# Patient Record
Sex: MALE | Race: WHITE | URBAN - METROPOLITAN AREA
[De-identification: names, ages, dates, MRNs, and addresses within clinical notes are randomized per-mention and may not be internally consistent; named-entity substitution may affect disease eponyms.]

---

## 2017-02-27 ENCOUNTER — OFFICE VISIT (OUTPATIENT)
Dept: PAIN MANAGEMENT | Age: 67
End: 2017-02-27

## 2017-02-27 VITALS — RESPIRATION RATE: 17 BRPM | SYSTOLIC BLOOD PRESSURE: 122 MMHG | DIASTOLIC BLOOD PRESSURE: 61 MMHG | HEART RATE: 88 BPM

## 2017-02-27 DIAGNOSIS — M54.59 MECHANICAL LOW BACK PAIN: ICD-10-CM

## 2017-02-27 DIAGNOSIS — I89.0 LYMPHEDEMA OF BOTH LOWER EXTREMITIES: ICD-10-CM

## 2017-02-27 DIAGNOSIS — M17.0 PRIMARY OSTEOARTHRITIS OF BOTH KNEES: ICD-10-CM

## 2017-02-27 DIAGNOSIS — M54.42 CHRONIC BILATERAL LOW BACK PAIN WITH LEFT-SIDED SCIATICA: ICD-10-CM

## 2017-02-27 DIAGNOSIS — M25.561 CHRONIC PAIN OF BOTH KNEES: ICD-10-CM

## 2017-02-27 DIAGNOSIS — M25.50 CHRONIC PAIN OF MULTIPLE JOINTS: Primary | ICD-10-CM

## 2017-02-27 DIAGNOSIS — M25.562 CHRONIC PAIN OF BOTH KNEES: ICD-10-CM

## 2017-02-27 DIAGNOSIS — G89.29 CHRONIC PAIN OF BOTH KNEES: ICD-10-CM

## 2017-02-27 DIAGNOSIS — G89.29 CHRONIC BILATERAL LOW BACK PAIN WITH LEFT-SIDED SCIATICA: ICD-10-CM

## 2017-02-27 DIAGNOSIS — G89.29 CHRONIC PAIN OF MULTIPLE JOINTS: Primary | ICD-10-CM

## 2017-02-27 DIAGNOSIS — G62.9 SENSORY MOTOR NEUROPATHY: ICD-10-CM

## 2017-02-27 RX ORDER — OXYCODONE HYDROCHLORIDE 5 MG/1
5 TABLET ORAL
Qty: 90 TAB | Refills: 0 | Status: SHIPPED | OUTPATIENT
Start: 2017-04-26 | End: 2017-05-18 | Stop reason: SDUPTHER

## 2017-02-27 RX ORDER — OXYCODONE HCL 40 MG/1
40 TABLET, FILM COATED, EXTENDED RELEASE ORAL EVERY 8 HOURS
Qty: 90 TAB | Refills: 0 | Status: SHIPPED | OUTPATIENT
Start: 2017-04-26 | End: 2017-05-18 | Stop reason: SDUPTHER

## 2017-02-27 RX ORDER — OXYCODONE HCL 40 MG/1
40 TABLET, FILM COATED, EXTENDED RELEASE ORAL EVERY 8 HOURS
Qty: 90 TAB | Refills: 0 | Status: SHIPPED | OUTPATIENT
Start: 2017-02-27 | End: 2017-05-18 | Stop reason: SDUPTHER

## 2017-02-27 RX ORDER — OXYCODONE HYDROCHLORIDE 5 MG/1
5 TABLET ORAL
Qty: 90 TAB | Refills: 0 | Status: SHIPPED | OUTPATIENT
Start: 2017-02-27 | End: 2017-05-18 | Stop reason: SDUPTHER

## 2017-02-27 RX ORDER — OXYCODONE HCL 40 MG/1
40 TABLET, FILM COATED, EXTENDED RELEASE ORAL EVERY 8 HOURS
Qty: 90 TAB | Refills: 0 | Status: SHIPPED | OUTPATIENT
Start: 2017-03-26 | End: 2017-05-18 | Stop reason: SDUPTHER

## 2017-02-27 RX ORDER — OXYCODONE HYDROCHLORIDE 5 MG/1
5 TABLET ORAL
Qty: 90 TAB | Refills: 0 | Status: SHIPPED | OUTPATIENT
Start: 2017-03-26 | End: 2017-05-18 | Stop reason: SDUPTHER

## 2017-02-27 NOTE — MR AVS SNAPSHOT
Visit Information Date & Time Provider Department Dept. Phone Encounter #  
 2/27/2017  8:30 AM Ruddy Renee MD [facility] [phone] [id number] Follow-up Instructions Return in about 3 months (around 5/27/2017). Follow-up and Disposition History Upcoming Health Maintenance Date Due Hepatitis C Screening 1950 DTaP/Tdap/Td series (1 - Tdap) 1/21/1973 FOBT Q 1 YEAR AGE 50-75 1/21/2002 ZOSTER VACCINE AGE 60> 1/21/2012 INFLUENZA AGE 9 TO ADULT 8/1/2016 GLAUCOMA SCREENING Q2Y 1/21/2017 Pneumococcal 65+ Low/Medium Risk (1 of 2 - PCV13) 1/21/2017 MEDICARE YEARLY EXAM 1/21/2017 Allergies as of 2/27/2017  Review Complete On: 2/27/2017 By: Ruddy Renee MD  
  
 Severity Noted Reaction Type Reactions Biaxin [Clarithromycin]    Not Reported This Time Current Immunizations  Never Reviewed No immunizations on file. Not reviewed this visit You Were Diagnosed With   
  
 Codes Comments Chronic pain of multiple joints    -  Primary ICD-10-CM: M25.50, G89.29 ICD-9-CM: 719.49, 338.29 Primary osteoarthritis of both knees     ICD-10-CM: M17.0 ICD-9-CM: 715.16 Sensory motor neuropathy     ICD-10-CM: G62.9 ICD-9-CM: 356.9 Chronic bilateral low back pain with left-sided sciatica     ICD-10-CM: M54.42, G89.29 ICD-9-CM: 724.2, 724.3, 338.29 Mechanical low back pain     ICD-10-CM: M54.5 ICD-9-CM: 724.2 Chronic pain of both knees     ICD-10-CM: M25.561, M25.562, G89.29 ICD-9-CM: 719.46, 338.29 Lymphedema of both lower extremities     ICD-10-CM: I89.0 ICD-9-CM: 868.0 Vitals BP  
  
  
  
  
  
 122/61 Vitals History Preferred Pharmacy Pharmacy Name Phone [address] [phone] Your Updated Medication List  
  
   
This list is accurate as of: 2/27/17  8:45 AM.  Always use your most recent med list.  
  
  
  
  
 ADALAT CC PO Take  by mouth. DULoxetine 30 mg capsule Commonly known as:  CYMBALTA Take 3 capsules by mouth daily. FISH OIL PO Take  by mouth. FUROSEMIDE PO Take  by mouth. LISINOPRIL PO Take  by mouth. LORATADINE PO Take  by mouth. LYRICA PO Take  by mouth. MULTIVITAMIN PO Take  by mouth. NEXIUM PO Take  by mouth. * oxyCODONE IR 5 mg immediate release tablet Commonly known as:  Ever Ivans Take 1 Tab by mouth three (3) times daily as needed for Pain for up to 30 days. * oxyCODONE ER 40 mg ER tablet Commonly known as:  OxyCONTIN Take 1 Tab by mouth every eight (8) hours for 30 days. Max Daily Amount: 120 mg.  
  
 * oxyCODONE IR 5 mg immediate release tablet Commonly known as:  Ever Ivans Take 1 Tab by mouth three (3) times daily as needed for Pain for up to 30 days. Start taking on:  3/26/2017 * oxyCODONE ER 40 mg ER tablet Commonly known as:  OxyCONTIN Take 1 Tab by mouth every eight (8) hours for 30 days. Max Daily Amount: 120 mg.  
Start taking on:  3/26/2017 * oxyCODONE IR 5 mg immediate release tablet Commonly known as:  Ever Ivans Take 1 Tab by mouth three (3) times daily as needed for Pain for up to 30 days. * Start taking on:  4/26/2017 * oxyCODONE ER 40 mg ER tablet Commonly known as:  OxyCONTIN Take 1 Tab by mouth every eight (8) hours for 30 days. Max Daily Amount: 120 mg.  
Start taking on:  4/26/2017 SIMVASTATIN PO Take  by mouth. TRAZODONE PO Take  by mouth. VITAMIN B-12 PO Take 500 mcg/day by mouth. VITAMIN D2 PO Take  by mouth. * Notice: This list has 6 medication(s) that are the same as other medications prescribed for you. Read the directions carefully, and ask your doctor or other care provider to review them with you. Prescriptions Printed Refills oxyCODONE IR (ROXICODONE) 5 mg immediate release tablet 0 Sig: Take 1 Tab by mouth three (3) times daily as needed for Pain for up to 30 days. Class: Print Route: Oral  
 oxyCODONE ER (OXYCONTIN) 40 mg ER tablet 0 Sig: Take 1 Tab by mouth every eight (8) hours for 30 days. Max Daily Amount: 120 mg.  
 Class: Print Route: Oral  
 oxyCODONE IR (ROXICODONE) 5 mg immediate release tablet 0 Starting on: 4/26/2017 Sig: Take 1 Tab by mouth three (3) times daily as needed for Pain for up to 30 days. *  
 Class: Print Route: Oral  
 oxyCODONE IR (ROXICODONE) 5 mg immediate release tablet 0 Starting on: 3/26/2017 Sig: Take 1 Tab by mouth three (3) times daily as needed for Pain for up to 30 days. Class: Print Route: Oral  
 oxyCODONE ER (OXYCONTIN) 40 mg ER tablet 0 Starting on: 4/26/2017 Sig: Take 1 Tab by mouth every eight (8) hours for 30 days. Max Daily Amount: 120 mg.  
 Class: Print Route: Oral  
 oxyCODONE ER (OXYCONTIN) 40 mg ER tablet 0 Starting on: 3/26/2017 Sig: Take 1 Tab by mouth every eight (8) hours for 30 days. Max Daily Amount: 120 mg.  
 Class: Print Route: Oral  
  
Follow-up Instructions Return in about 3 months (around 5/27/2017). [facility] SERVICES! Kiara Solis introduces [employer] patient portal. Now you can access parts of your medical record, email your doctor's office, and request medication refills online. 1. In your internet browser, go to [email or web address] 2. Click on the First Time User? Click Here link in the Sign In box. You will see the New Member Sign Up page. 3. Enter your Conviva Access Code exactly as it appears below. You will not need to use this code after youve completed the sign-up process. If you do not sign up before the expiration date, you must request a new code. · Conviva Access Code: [email or web address] Expires: 2/27/2017  8:56 AM 
 
 4. Enter the last four digits of your Social Security Number (xxxx) and Date of Birth (mm/dd/yyyy) as indicated and click Submit. You will be taken to the next sign-up page. 5. Create a B[employer] ID. This will be your B[employer] login ID and cannot be changed, so think of one that is secure and easy to remember. 6. Create a [employer] password. You can change your password at any time. 7. Enter your Password Reset Question and Answer. This can be used at a later time if you forget your password. 8. Enter your e-mail address. You will receive e-mail notification when new information is available in [address] 9. Click Sign Up. You can now view and download portions of your medical record. 10. Click the Download Summary menu link to download a portable copy of your medical information. If you have questions, please visit the Frequently Asked Questions section of the [employer] website. Remember, [employer] is NOT to be used for urgent needs. For medical emergencies, dial 911. Now available from your iPhone and Android! Please provide this summary of care documentation to your next provider. Your primary care clinician is listed as Bo Epley. If you have any questions after today's visit, please call [phone].

## 2017-02-27 NOTE — PROGRESS NOTES
HISTORY OF PRESENT ILLNESS  Mariluz Martins is a 74 y.o. male. HPI Comments: Meds help with pain control and quality of life. No new side effects reported today. No new medical problems reported today. Visit survey reviewed, and will be scanned. Recent Average pain level (out of 10). --  4  Chief complaint, pain to multiple joints  Back pain  Chronic pain  Today's visit is a 3 month follow-up  80% complete relief in the past 30 days  Using oxycodone 5 mg 3 times a day as needed  OxyContin 40 mg every 8 hours            Review of Systems   Constitutional: Negative for chills and fever. HENT: Negative for congestion and sore throat. Respiratory: Negative for cough and wheezing. Cardiovascular: Positive for leg swelling (chronic). Negative for palpitations. Gastrointestinal: Positive for constipation. Negative for diarrhea, heartburn, nausea and vomiting. Genitourinary: Negative. Musculoskeletal: Positive for joint pain. Negative for falls. Skin: Negative for rash. Neurological: Negative for loss of consciousness. Psychiatric/Behavioral: Negative for depression. The patient is not nervous/anxious. Physical Exam   Constitutional: He appears well-developed and well-nourished. He is cooperative. He does not have a sickly appearance. HENT:   Head: Normocephalic and atraumatic. Right Ear: External ear normal. No drainage. Left Ear: External ear normal. No drainage. Nose: Nose normal.   Eyes: Lids are normal. Right eye exhibits no discharge. Left eye exhibits no discharge. Right conjunctiva has no hemorrhage. Left conjunctiva has no hemorrhage. Neck: Neck supple. No tracheal deviation present. No thyroid mass present. Pulmonary/Chest: Effort normal. No respiratory distress. Neurological: He is alert. No cranial nerve deficit. Skin: Skin is intact. No rash noted. Psychiatric: His speech is normal. His affect is not angry. He does not express inappropriate judgment.    Nursing note and vitals reviewed. ASSESSMENT and PLAN  Encounter Diagnoses   Name Primary?  Chronic pain of multiple joints Yes    Primary osteoarthritis of both knees     Sensory motor neuropathy     Chronic bilateral low back pain with left-sided sciatica     Mechanical low back pain     Chronic pain of both knees     Lymphedema of both lower extremities    No signs of addiction or diversion. The primary Dxes. ,including pain are controlled. Pain/Pain control/Meds and Quality Of Life have been reviewed. Nonpharmacologic therapy and non-opioid pharmacologic therapy are always considered. If opioid therapy is prescribed, this is only if the expected benefits for both pain and function are anticipated to outweigh risks. Possible changes to treatment plan considered. Support/education given as needed. Today-medications are as listed. No significant changes to medications. Follow up -- 3 months.

## 2017-02-27 NOTE — PROGRESS NOTES
Nursing Notes    Patient presents to the office today in follow-up. Reviewed medications with counts as follows:    Rx Date filled Qty Dispensed Pill Count Last Dose Short   oxycontin 40 mg 2/4/17 90 35 This am.1  dose no   Oxycodone 5 mg 2/4/17 90 59 yesterday no   Mr. Cayla Bruner has a reminder for a \"due or due soon\" health maintenance. I have asked that he contact his primary care provider for follow-up on this health maintenance. POC UDS was not performed in office today    Any new labs or imaging since last appointment? NO    Have you been to an emergency room (ER) or urgent care clinic since your last visit? NO            Have you been hospitalized since your last visit? NO     If yes, where, when, and reason for visit? Have you seen or consulted any other health care providers outside of the [facility]  since your last visit? NO     If yes, where, when, and reason for visit?

## 2017-05-18 ENCOUNTER — OFFICE VISIT (OUTPATIENT)
Dept: PAIN MANAGEMENT | Age: 67
End: 2017-05-18

## 2017-05-18 VITALS
HEART RATE: 93 BPM | SYSTOLIC BLOOD PRESSURE: 128 MMHG | HEIGHT: 73 IN | BODY MASS INDEX: 40.69 KG/M2 | DIASTOLIC BLOOD PRESSURE: 84 MMHG | WEIGHT: 307 LBS

## 2017-05-18 DIAGNOSIS — M54.42 CHRONIC BILATERAL LOW BACK PAIN WITH LEFT-SIDED SCIATICA: ICD-10-CM

## 2017-05-18 DIAGNOSIS — M17.0 PRIMARY OSTEOARTHRITIS OF BOTH KNEES: ICD-10-CM

## 2017-05-18 DIAGNOSIS — G62.9 SENSORY MOTOR NEUROPATHY: ICD-10-CM

## 2017-05-18 DIAGNOSIS — Z79.899 ENCOUNTER FOR LONG-TERM (CURRENT) USE OF HIGH-RISK MEDICATION: ICD-10-CM

## 2017-05-18 DIAGNOSIS — M16.10 ARTHRITIS OF HIP: ICD-10-CM

## 2017-05-18 DIAGNOSIS — I89.0 LYMPHEDEMA OF BOTH LOWER EXTREMITIES: ICD-10-CM

## 2017-05-18 DIAGNOSIS — M25.50 CHRONIC PAIN OF MULTIPLE JOINTS: Primary | ICD-10-CM

## 2017-05-18 DIAGNOSIS — M25.561 CHRONIC PAIN OF BOTH KNEES: ICD-10-CM

## 2017-05-18 DIAGNOSIS — G89.29 CHRONIC PAIN OF BOTH KNEES: ICD-10-CM

## 2017-05-18 DIAGNOSIS — G89.29 CHRONIC BILATERAL LOW BACK PAIN WITH LEFT-SIDED SCIATICA: ICD-10-CM

## 2017-05-18 DIAGNOSIS — M54.59 MECHANICAL LOW BACK PAIN: ICD-10-CM

## 2017-05-18 DIAGNOSIS — G89.29 CHRONIC PAIN OF MULTIPLE JOINTS: Primary | ICD-10-CM

## 2017-05-18 DIAGNOSIS — M25.562 CHRONIC PAIN OF BOTH KNEES: ICD-10-CM

## 2017-05-18 LAB
ALCOHOL UR POC: NORMAL
AMPHETAMINES UR POC: NORMAL
BARBITURATES UR POC: NORMAL
BENZODIAZEPINES UR POC: NORMAL
BUPRENORPHINE UR POC: NORMAL
CANNABINOIDS UR POC: NORMAL
CARISOPRODOL UR POC: NORMAL
COCAINE UR POC: NORMAL
FENTANYL UR POC: NORMAL
MDMA/ECSTASY UR POC: NORMAL
METHADONE UR POC: NORMAL
METHAMPHETAMINE UR POC: NORMAL
METHYLPHENIDATE UR POC: NORMAL
OPIATES UR POC: NORMAL
OXYCODONE UR POC: NORMAL
PHENCYCLIDINE UR POC: NORMAL
PROPOXYPHENE UR POC: NORMAL
TRAMADOL UR POC: NORMAL
TRICYCLICS UR POC: NORMAL

## 2017-05-18 RX ORDER — OXYCODONE HYDROCHLORIDE 5 MG/1
5 TABLET ORAL
Qty: 90 TAB | Refills: 0 | Status: SHIPPED | OUTPATIENT
Start: 2017-06-17 | End: 2017-08-17 | Stop reason: SDUPTHER

## 2017-05-18 RX ORDER — OXYCODONE HCL 40 MG/1
40 TABLET, FILM COATED, EXTENDED RELEASE ORAL EVERY 8 HOURS
Qty: 90 TAB | Refills: 0 | Status: SHIPPED | OUTPATIENT
Start: 2017-07-16 | End: 2017-08-17 | Stop reason: SDUPTHER

## 2017-05-18 RX ORDER — NALOXONE HYDROCHLORIDE 4 MG/.1ML
4 SPRAY NASAL AS NEEDED
Qty: 1 BOX | Refills: 0 | Status: SHIPPED | OUTPATIENT
Start: 2017-05-18

## 2017-05-18 RX ORDER — OXYCODONE HCL 40 MG/1
40 TABLET, FILM COATED, EXTENDED RELEASE ORAL EVERY 8 HOURS
Qty: 90 TAB | Refills: 0 | Status: SHIPPED | OUTPATIENT
Start: 2017-06-17 | End: 2017-08-17 | Stop reason: SDUPTHER

## 2017-05-18 RX ORDER — OXYCODONE HYDROCHLORIDE 5 MG/1
5 TABLET ORAL
Qty: 90 TAB | Refills: 0 | Status: SHIPPED | OUTPATIENT
Start: 2017-05-18 | End: 2017-08-17 | Stop reason: SDUPTHER

## 2017-05-18 RX ORDER — OXYCODONE HYDROCHLORIDE 5 MG/1
5 TABLET ORAL
Qty: 90 TAB | Refills: 0 | Status: SHIPPED | OUTPATIENT
Start: 2017-07-16 | End: 2017-08-17 | Stop reason: SDUPTHER

## 2017-05-18 RX ORDER — OXYCODONE HCL 40 MG/1
40 TABLET, FILM COATED, EXTENDED RELEASE ORAL EVERY 8 HOURS
Qty: 90 TAB | Refills: 0 | Status: SHIPPED | OUTPATIENT
Start: 2017-05-18 | End: 2017-08-17 | Stop reason: SDUPTHER

## 2017-05-18 NOTE — MR AVS SNAPSHOT
Visit Information Date & Time Provider Department Dept. Phone Encounter #  
 5/18/2017  8:00 AM Ingrid eMrino MD [address] [place] [address] for Pain Management [phone] Follow-up Instructions Return in about 3 months (around 8/18/2017). Follow-up and Disposition History Upcoming Health Maintenance Date Due Hepatitis C Screening 1950 DTaP/Tdap/Td series (1 - Tdap) 1/21/1973 FOBT Q 1 YEAR AGE 50-75 1/21/2002 ZOSTER VACCINE AGE 60> 1/21/2012 GLAUCOMA SCREENING Q2Y 1/21/2017 Pneumococcal 65+ Low/Medium Risk (1 of 2 - PCV13) 1/21/2017 MEDICARE YEARLY EXAM 1/21/2017 INFLUENZA AGE 9 TO ADULT 8/1/2017 Allergies as of 5/18/2017  Review Complete On: 5/18/2017 By: Ingrid Merino MD  
  
 Severity Noted Reaction Type Reactions Biaxin [Clarithromycin]    Not Reported This Time Current Immunizations  Never Reviewed No immunizations on file. Not reviewed this visit You Were Diagnosed With   
  
 Codes Comments Chronic pain of multiple joints    -  Primary ICD-10-CM: M25.50, G89.29 ICD-9-CM: 719.49, 338.29 Primary osteoarthritis of both knees     ICD-10-CM: M17.0 ICD-9-CM: 715.16 Arthritis of hip     ICD-10-CM: M16.10 ICD-9-CM: 716.95 Sensory motor neuropathy     ICD-10-CM: G62.9 ICD-9-CM: 356.9 Chronic bilateral low back pain with left-sided sciatica     ICD-10-CM: M54.42, G89.29 ICD-9-CM: 724.2, 724.3, 338.29 Mechanical low back pain     ICD-10-CM: M54.5 ICD-9-CM: 724.2 Chronic pain of both knees     ICD-10-CM: M25.561, M25.562, G89.29 ICD-9-CM: 719.46, 338.29 Lymphedema of both lower extremities     ICD-10-CM: I89.0 ICD-9-CM: 444.6 Vitals BP Pulse Height(growth percentile) Weight(growth percentile) BMI Smoking Status 128/84 93 6' 1\" (1.854 m) 307 lb (139.3 kg) 40.5 kg/m2 Former Smoker Vitals History BMI and BSA Data Body Mass Index Body Surface Area 40.5 kg/m 2 2.68 m 2 Preferred Pharmacy Pharmacy Name Phone [facility] [phone] Your Updated Medication List  
  
   
This list is accurate as of: 5/18/17  8:20 AM.  Always use your most recent med list.  
  
  
  
  
 ADALAT CC PO Take  by mouth. DULoxetine 30 mg capsule Commonly known as:  CYMBALTA Take 3 capsules by mouth daily. FISH OIL PO Take  by mouth. FUROSEMIDE PO Take  by mouth. LISINOPRIL PO Take  by mouth. LORATADINE PO Take  by mouth. LYRICA PO Take  by mouth. MULTIVITAMIN PO Take  by mouth.  
  
 naloxone 4 mg/actuation Spry 4 mg by Nasal route as needed. NEXIUM PO Take  by mouth. * oxyCODONE IR 5 mg immediate release tablet Commonly known as:  Isael Longs Take 1 Tab by mouth three (3) times daily as needed for Pain for up to 30 days. * oxyCODONE ER 40 mg ER tablet Commonly known as:  OxyCONTIN Take 1 Tab by mouth every eight (8) hours for 30 days. Max Daily Amount: 120 mg.  
  
 * oxyCODONE IR 5 mg immediate release tablet Commonly known as:  Isael Longs Take 1 Tab by mouth three (3) times daily as needed for Pain for up to 30 days. Start taking on:  6/17/2017 * oxyCODONE ER 40 mg ER tablet Commonly known as:  OxyCONTIN Take 1 Tab by mouth every eight (8) hours for 30 days. Max Daily Amount: 120 mg.  
Start taking on:  6/17/2017 * oxyCODONE IR 5 mg immediate release tablet Commonly known as:  Isael Longs Take 1 Tab by mouth three (3) times daily as needed for Pain for up to 30 days. * Start taking on:  7/16/2017 * oxyCODONE ER 40 mg ER tablet Commonly known as:  OxyCONTIN Take 1 Tab by mouth every eight (8) hours for 30 days. Max Daily Amount: 120 mg.  
Start taking on:  7/16/2017 SIMVASTATIN PO Take  by mouth. TRAZODONE PO Take  by mouth. VITAMIN B-12 PO Take 500 mcg/day by mouth. VITAMIN D2 PO Take  by mouth. * Notice: This list has 6 medication(s) that are the same as other medications prescribed for you. Read the directions carefully, and ask your doctor or other care provider to review them with you. Prescriptions Printed Refills  
 oxyCODONE IR (ROXICODONE) 5 mg immediate release tablet 0 Starting on: 2017 Sig: Take 1 Tab by mouth three (3) times daily as needed for Pain for up to 30 days. *  
 Class: Print Route: Oral  
 oxyCODONE ER (OXYCONTIN) 40 mg ER tablet 0 Starting on: 2017 Sig: Take 1 Tab by mouth every eight (8) hours for 30 days. Max Daily Amount: 120 mg.  
 Class: Print Route: Oral  
 oxyCODONE IR (ROXICODONE) 5 mg immediate release tablet 0 Sig: Take 1 Tab by mouth three (3) times daily as needed for Pain for up to 30 days. Class: Print Route: Oral  
 oxyCODONE ER (OXYCONTIN) 40 mg ER tablet 0 Sig: Take 1 Tab by mouth every eight (8) hours for 30 days. Max Daily Amount: 120 mg.  
 Class: Print Route: Oral  
 oxyCODONE IR (ROXICODONE) 5 mg immediate release tablet 0 Starting on: 2017 Sig: Take 1 Tab by mouth three (3) times daily as needed for Pain for up to 30 days. Class: Print Route: Oral  
 oxyCODONE ER (OXYCONTIN) 40 mg ER tablet 0 Starting on: 2017 Sig: Take 1 Tab by mouth every eight (8) hours for 30 days. Max Daily Amount: 120 mg.  
 Class: Print Route: Oral  
 naloxone 4 mg/actuation spry 0 Si mg by Nasal route as needed. Class: Print Route: Nasal  
  
Follow-up Instructions Return in about 3 months (around 2017). Introducing [facility]! [address] introduces [employer] patient portal. Now you can access parts of your medical record, email your doctor's office, and request medication refills online. 1. In your internet browser, go to [email or web address] 2. Click on the First Time User? Click Here link in the Sign In box. You will see the New Member Sign Up page. 3. Enter your [employer] Access Code exactly as it appears below. You will not need to use this code after youve completed the sign-up process. If you do not sign up before the expiration date, you must request a new code. · [employer] Access Code: [facility] Expires: 8/16/2017  7:51 AM 
 
4. Enter the last four digits of your Social Security Number (xxxx) and Date of Birth (mm/dd/yyyy) as indicated and click Submit. You will be taken to the next sign-up page. 5. Create a Oper[employer] Media ID. This will be your [employer] login ID and cannot be changed, so think of one that is secure and easy to remember. 6. Create a [employer] password. You can change your password at any time. 7. Enter your Password Reset Question and Answer. This can be used at a later time if you forget your password. 8. Enter your e-mail address. You will receive e-mail notification when new information is available in [address] 9. Click Sign Up. You can now view and download portions of your medical record. 10. Click the Download Summary menu link to download a portable copy of your medical information. If you have questions, please visit the Frequently Asked Questions section of the [employer] website. Remember, [employer] is NOT to be used for urgent needs. For medical emergencies, dial 911. Now available from your iPhone and Android! Please provide this summary of care documentation to your next provider. Your primary care clinician is listed as Rosa Record. If you have any questions after today's visit, please call [phone].

## 2017-05-18 NOTE — PROGRESS NOTES
HISTORY OF PRESENT ILLNESS  Petty Anaya is a 74 y.o. male. HPI Comments: Meds help with pain control and quality of life. No new side effects reported today. Visit survey reviewed and will be scanned.  reviewed. Recent average level of pain(out of 10)-2  Chief complaint, polyarthralgia  Chronic pain  Using OxyContin 40 mg 3 times a day  Oxycodone 5 mg 3 times a day as needed  He is routinely seen every 3 months  Medication helps improve general activity, mood, walking, sleep, enjoyment of life      Measuring clinical outcomes of chronic pain patients. This was reviewed today. The survey will be scanned. Please see the survey for details. Total score-5    Knee Pain     Back Pain    Pertinent negatives include no fever. Foot Pain     Hand Pain    Associated symptoms include back pain. Review of Systems   Constitutional: Negative for chills and fever. HENT: Negative for congestion and sore throat. Respiratory: Negative for cough and wheezing. Cardiovascular: Positive for leg swelling (chronic). Negative for palpitations. Gastrointestinal: Positive for constipation. Negative for diarrhea, heartburn, nausea and vomiting. Genitourinary: Negative. Musculoskeletal: Positive for back pain and joint pain. Negative for falls. Skin: Negative for rash. Neurological: Negative for loss of consciousness. Psychiatric/Behavioral: Negative for depression. The patient is not nervous/anxious. Physical Exam   Constitutional: He appears well-developed and well-nourished. He is cooperative. He does not have a sickly appearance. HENT:   Head: Normocephalic and atraumatic. Right Ear: External ear normal. No drainage. Left Ear: External ear normal. No drainage. Nose: Nose normal.   Eyes: Lids are normal. Right eye exhibits no discharge. Left eye exhibits no discharge. Right conjunctiva has no hemorrhage. Left conjunctiva has no hemorrhage. Neck: Neck supple.  No tracheal deviation present. No thyroid mass present. Pulmonary/Chest: Effort normal. No respiratory distress. Neurological: He is alert. No cranial nerve deficit. Skin: Skin is intact. No rash noted. Psychiatric: His speech is normal. His affect is not angry. He does not express inappropriate judgment. Nursing note and vitals reviewed. ASSESSMENT and PLAN  Encounter Diagnoses   Name Primary?  Chronic pain of multiple joints Yes    Primary osteoarthritis of both knees     Arthritis of hip     Sensory motor neuropathy     Chronic bilateral low back pain with left-sided sciatica     Mechanical low back pain     Chronic pain of both knees     Lymphedema of both lower extremities    No indicators for recent medication misuse.  reviewed. Pain Meds and Quality Of Life have been reviewed. Nonpharmacologic therapy and non-opioid pharmacologic therapy were considered. If opioid therapy is prescribed, this is only if the expected benefits are anticipated to outweigh risks. Possible changes to treatment plan considered. Support/education given as needed. Today-medications are as listed. No significant changes to medications. Follow up -- 3 months.    --Urine test or oral swab today. Also, the prescription monitoring program was reviewed today. The majority of today's visit was spent counseling and coordinating care. Total visit time-40 minutes. -Dragon medical dictation software was used for portions of this report. Unintended errors may occur. Because the patient's current regimen places him/her at increased risk for possible overdose, a prescription for naloxone is being provided. The patient understands that this medication is only to be used in the setting of a possible overdose and that inadvertent use of this medication could precipitate overt withdrawal.  I discussed naloxone with the patient today. In addition, the patient has received the naloxone instruction sheet.

## 2017-05-18 NOTE — PROGRESS NOTES
Nursing Notes    Patient presents to the office today in follow-up. Patient rates his pain at 4/10 on the numerical pain scale. Reviewed medications with counts as follows:    Rx Date filled Qty Dispensed Pill Count Last Dose Short   oxycontin 40 mg  05/03/17 90 49 today no   Oxycodone 5 mg  05/03/17 90 56 today no                        POC UDS was performed in office today    Any new labs or imaging since last appointment? NO    Have you been to an emergency room (ER) or urgent care clinic since your last visit? NO            Have you been hospitalized since your last visit? NO     If yes, where, when, and reason for visit? Have you seen or consulted any other health care providers outside of the [address] [place] [address]  since your last visit? NO     If yes, where, when, and reason for visit? HM deferred to pcp.

## 2017-08-17 ENCOUNTER — OFFICE VISIT (OUTPATIENT)
Dept: PAIN MANAGEMENT | Age: 67
End: 2017-08-17

## 2017-08-17 VITALS
WEIGHT: 307 LBS | TEMPERATURE: 97.2 F | BODY MASS INDEX: 40.69 KG/M2 | HEART RATE: 92 BPM | DIASTOLIC BLOOD PRESSURE: 62 MMHG | RESPIRATION RATE: 18 BRPM | HEIGHT: 73 IN | SYSTOLIC BLOOD PRESSURE: 130 MMHG

## 2017-08-17 DIAGNOSIS — M25.50 CHRONIC PAIN OF MULTIPLE JOINTS: ICD-10-CM

## 2017-08-17 DIAGNOSIS — G89.29 CHRONIC BILATERAL LOW BACK PAIN WITH LEFT-SIDED SCIATICA: ICD-10-CM

## 2017-08-17 DIAGNOSIS — G89.29 CHRONIC PAIN OF MULTIPLE JOINTS: ICD-10-CM

## 2017-08-17 DIAGNOSIS — M25.562 CHRONIC PAIN OF BOTH KNEES: Primary | ICD-10-CM

## 2017-08-17 DIAGNOSIS — I89.0 LYMPHEDEMA OF BOTH LOWER EXTREMITIES: ICD-10-CM

## 2017-08-17 DIAGNOSIS — M25.561 CHRONIC PAIN OF BOTH KNEES: Primary | ICD-10-CM

## 2017-08-17 DIAGNOSIS — G89.4 CHRONIC PAIN SYNDROME: ICD-10-CM

## 2017-08-17 DIAGNOSIS — M54.42 CHRONIC BILATERAL LOW BACK PAIN WITH LEFT-SIDED SCIATICA: ICD-10-CM

## 2017-08-17 DIAGNOSIS — G89.29 CHRONIC PAIN OF BOTH KNEES: Primary | ICD-10-CM

## 2017-08-17 RX ORDER — OXYCODONE HCL 40 MG/1
40 TABLET, FILM COATED, EXTENDED RELEASE ORAL EVERY 8 HOURS
Qty: 90 TAB | Refills: 0 | Status: SHIPPED | OUTPATIENT
Start: 2017-08-17 | End: 2017-09-16

## 2017-08-17 RX ORDER — OXYCODONE HCL 40 MG/1
40 TABLET, FILM COATED, EXTENDED RELEASE ORAL EVERY 8 HOURS
Qty: 90 TAB | Refills: 0 | Status: SHIPPED | OUTPATIENT
Start: 2017-09-16 | End: 2017-10-16

## 2017-08-17 RX ORDER — OXYCODONE HYDROCHLORIDE 5 MG/1
5 TABLET ORAL
Qty: 90 TAB | Refills: 0 | Status: SHIPPED | OUTPATIENT
Start: 2017-10-15 | End: 2017-11-14

## 2017-08-17 RX ORDER — OXYCODONE HYDROCHLORIDE 5 MG/1
5 TABLET ORAL
Qty: 90 TAB | Refills: 0 | Status: SHIPPED | OUTPATIENT
Start: 2017-08-17 | End: 2017-09-16

## 2017-08-17 RX ORDER — OXYCODONE HCL 40 MG/1
40 TABLET, FILM COATED, EXTENDED RELEASE ORAL EVERY 8 HOURS
Qty: 90 TAB | Refills: 0 | Status: SHIPPED | OUTPATIENT
Start: 2017-10-15 | End: 2017-11-14

## 2017-08-17 RX ORDER — OXYCODONE HYDROCHLORIDE 5 MG/1
5 TABLET ORAL
Qty: 90 TAB | Refills: 0 | Status: SHIPPED | OUTPATIENT
Start: 2017-09-16 | End: 2017-10-16

## 2017-08-17 NOTE — PROGRESS NOTES
Nursing Notes    Patient presents to the office today in follow-up. Patient rates his pain at 3/10 on the numerical pain scale. Reviewed medications with counts as follows:    Rx Date filled Qty Dispensed Pill Count Last Dose Short   Oxycodone ER 40 mg 08/02/17 90 51 today no   Oxycodone 5 mg  08/02/17 90 42 today no                        POC UDS was not performed in office today    Any new labs or imaging since last appointment? NO    Have you been to an emergency room (ER) or urgent care clinic since your last visit? NO            Have you been hospitalized since your last visit? NO     If yes, where, when, and reason for visit? Have you seen or consulted any other health care providers outside of the [address] [place] [address]  since your last visit? NO     If yes, where, when, and reason for visit? HM deferred to pcp.

## 2017-08-17 NOTE — MR AVS SNAPSHOT
Visit Information Date & Time Provider Department Dept. Phone Encounter #  
 8/17/2017  8:00 AM Wisam Baxter MD [facility] [id number] [phone] Follow-up Instructions Return in about 3 months (around 11/17/2017). Upcoming Health Maintenance Date Due Hepatitis C Screening 1950 DTaP/Tdap/Td series (1 - Tdap) 1/21/1973 FOBT Q 1 YEAR AGE 50-75 1/21/2002 ZOSTER VACCINE AGE 60> 11/21/2011 GLAUCOMA SCREENING Q2Y 1/21/2017 Pneumococcal 65+ Low/Medium Risk (1 of 2 - PCV13) 1/21/2017 MEDICARE YEARLY EXAM 1/21/2017 INFLUENZA AGE 9 TO ADULT 8/1/2017 Allergies as of 8/17/2017  Review Complete On: 8/17/2017 By: Wisam Baxter MD  
  
 Severity Noted Reaction Type Reactions Biaxin [Clarithromycin]    Not Reported This Time Current Immunizations  Never Reviewed No immunizations on file. Not reviewed this visit You Were Diagnosed With   
  
 Codes Comments Chronic bilateral low back pain with left-sided sciatica     ICD-10-CM: M54.42, G89.29 ICD-9-CM: 724.2, 724.3, 338.29 Chronic pain of both knees     ICD-10-CM: M25.561, M25.562, G89.29 ICD-9-CM: 719.46, 338.29 Vitals BP Pulse Temp Resp Height(growth percentile) Weight(growth percentile) 130/62 92 97.2 °F (36.2 °C) 18 6' 1\" (1.854 m) 307 lb (139.3 kg) BMI Smoking Status 40.5 kg/m2 Former Smoker BMI and BSA Data Body Mass Index Body Surface Area 40.5 kg/m 2 2.68 m 2 Preferred Pharmacy Pharmacy Name Phone [address] [phone] Your Updated Medication List  
  
   
This list is accurate as of: 8/17/17  8:15 AM.  Always use your most recent med list.  
  
  
  
  
 ADALAT CC PO Take  by mouth. DULoxetine 30 mg capsule Commonly known as:  CYMBALTA Take 3 capsules by mouth daily. FISH OIL PO Take  by mouth. FUROSEMIDE PO Take  by mouth. LISINOPRIL PO Take  by mouth. LORATADINE PO Take  by mouth. LYRICA PO Take  by mouth. MULTIVITAMIN PO Take  by mouth.  
  
 naloxone 4 mg/actuation Spry 4 mg by Nasal route as needed. NEXIUM PO Take  by mouth. SIMVASTATIN PO Take  by mouth. TRAZODONE PO Take  by mouth. VITAMIN B-12 PO Take 500 mcg/day by mouth. VITAMIN D2 PO Take  by mouth. Follow-up Instructions Return in about 3 months (around 11/17/2017). Introducing [facility]! [facility] introduces [employer] patient portal. Now you can access parts of your medical record, email your doctor's office, and request medication refills online. 1. In your internet browser, go to [email or web address] 2. Click on the First Time User? Click Here link in the Sign In box. You will see the New Member Sign Up page. 3. Enter your [employer] Access Code exactly as it appears below. You will not need to use this code after youve completed the sign-up process. If you do not sign up before the expiration date, you must request a new code. · [employer] Access Code: [email or web address] Expires: 11/15/2017  8:15 AM 
 
4. Enter the last four digits of your Social Security Number (xxxx) and Date of Birth (mm/dd/yyyy) as indicated and click Submit. You will be taken to the next sign-up page. 5. Create a [employer] ID. This will be your [employer] login ID and cannot be changed, so think of one that is secure and easy to remember. 6. Create a [employer] password. You can change your password at any time. 7. Enter your Password Reset Question and Answer. This can be used at a later time if you forget your password. 8. Enter your e-mail address. You will receive e-mail notification when new information is available in [address] 9. Click Sign Up. You can now view and download portions of your medical record. 10. Click the Download Summary menu link to download a portable copy of your medical information. If you have questions, please visit the Frequently Asked Questions section of the [employer] website. Remember, [employer] is NOT to be used for urgent needs. For medical emergencies, dial 911. Now available from your iPhone and Android! Please provide this summary of care documentation to your next provider. Your primary care clinician is listed as Nupur Tinajero. If you have any questions after today's visit, please call [phone].

## 2017-08-17 NOTE — ACP (ADVANCE CARE PLANNING)
The pt states that he has an advanced medical directive, POA, and living will. None of these documents can be found in the pt's chart. The pt was instructed to bring in copies of these documents so that they can be scanned in to the chart. He and his wife verbalized understanding.

## 2017-08-17 NOTE — PROGRESS NOTES
HISTORY OF PRESENT ILLNESS  Cosme Dance is a 74 y.o. male. HPI Comments: Meds help with pain control and quality of life. No new side effects reported today. Visit survey reviewed and will be scanned.  reviewed. Recent average level of pain(out of 10)-2  Chief complaint bilateral knee pain, back pain, pain to multiple joints  Chronic pain syndrome  Using OxyContin 40 mg every 8 hours  Oxycodone 5 mg 3 times a day as needed  He is usually seen about every 3 months  Medication helps improve general activity, mood, walking, sleep, enjoyment of life      Measuring clinical outcomes of chronic pain patients. This was reviewed today. The survey will be scanned. Please see the survey for details. Total score4      Review of Systems   Constitutional: Negative for chills and fever. HENT: Negative for congestion and sore throat. Respiratory: Negative for cough and wheezing. Cardiovascular: Positive for leg swelling (chronic). Negative for palpitations. Gastrointestinal: Positive for constipation. Negative for diarrhea, heartburn, nausea and vomiting. Genitourinary: Negative. Musculoskeletal: Positive for back pain and joint pain. Negative for falls. Skin: Negative for rash. Neurological: Negative for loss of consciousness. Psychiatric/Behavioral: Negative for depression and suicidal ideas. The patient is not nervous/anxious. Physical Exam   Constitutional: He appears well-developed and well-nourished. He is cooperative. He does not have a sickly appearance. HENT:   Head: Normocephalic and atraumatic. Right Ear: External ear normal. No drainage. Left Ear: External ear normal. No drainage. Nose: Nose normal.   Eyes: Lids are normal. Right eye exhibits no discharge. Left eye exhibits no discharge. Right conjunctiva has no hemorrhage. Left conjunctiva has no hemorrhage. Neck: Neck supple. No tracheal deviation present. No thyroid mass present.    Pulmonary/Chest: Effort normal. No respiratory distress. Neurological: He is alert. No cranial nerve deficit. Skin: Skin is intact. No rash noted. Psychiatric: His speech is normal. His affect is not angry. He does not express inappropriate judgment. Nursing note and vitals reviewed. ASSESSMENT and PLAN  Encounter Diagnoses   Name Primary?  Chronic pain of both knees Yes    Chronic bilateral low back pain with left-sided sciatica     Chronic pain of multiple joints     Lymphedema of both lower extremities     Chronic pain syndrome    No indicators for recent medication misuse.  reviewed. Pain Meds and Quality Of Life have been reviewed. Nonpharmacologic therapy and non-opioid pharmacologic therapy were considered. If opioid therapy is prescribed, this is only if the expected benefits are anticipated to outweigh risks. Possible changes to treatment plan considered. Support/education given as needed. Today-medications are as listed. No significant changes to medications. Follow up -- 3 months.